# Patient Record
Sex: FEMALE | Race: BLACK OR AFRICAN AMERICAN | NOT HISPANIC OR LATINO
[De-identification: names, ages, dates, MRNs, and addresses within clinical notes are randomized per-mention and may not be internally consistent; named-entity substitution may affect disease eponyms.]

---

## 2017-05-14 ENCOUNTER — FORM ENCOUNTER (OUTPATIENT)
Age: 52
End: 2017-05-14

## 2017-09-17 ENCOUNTER — FORM ENCOUNTER (OUTPATIENT)
Age: 52
End: 2017-09-17

## 2018-07-19 ENCOUNTER — FORM ENCOUNTER (OUTPATIENT)
Age: 53
End: 2018-07-19

## 2019-01-24 ENCOUNTER — FORM ENCOUNTER (OUTPATIENT)
Age: 54
End: 2019-01-24

## 2019-07-25 ENCOUNTER — FORM ENCOUNTER (OUTPATIENT)
Age: 54
End: 2019-07-25

## 2020-03-10 ENCOUNTER — FORM ENCOUNTER (OUTPATIENT)
Age: 55
End: 2020-03-10

## 2020-09-17 ENCOUNTER — FORM ENCOUNTER (OUTPATIENT)
Age: 55
End: 2020-09-17

## 2021-03-04 PROBLEM — Z00.00 ENCOUNTER FOR PREVENTIVE HEALTH EXAMINATION: Status: ACTIVE | Noted: 2021-03-04

## 2021-04-23 RX ORDER — CHROMIUM 200 MCG
TABLET ORAL
Refills: 0 | Status: ACTIVE | COMMUNITY

## 2021-04-23 RX ORDER — ASCORBIC ACID 500 MG
TABLET ORAL
Refills: 0 | Status: ACTIVE | COMMUNITY

## 2021-04-23 RX ORDER — ANASTROZOLE TABLETS 1 MG/1
TABLET ORAL
Refills: 0 | Status: ACTIVE | COMMUNITY

## 2021-04-26 ENCOUNTER — APPOINTMENT (OUTPATIENT)
Dept: BREAST CENTER | Facility: CLINIC | Age: 56
End: 2021-04-26
Payer: COMMERCIAL

## 2021-04-26 ENCOUNTER — NON-APPOINTMENT (OUTPATIENT)
Age: 56
End: 2021-04-26

## 2021-04-26 VITALS
BODY MASS INDEX: 29.09 KG/M2 | SYSTOLIC BLOOD PRESSURE: 148 MMHG | DIASTOLIC BLOOD PRESSURE: 95 MMHG | WEIGHT: 181 LBS | HEART RATE: 93 BPM | HEIGHT: 66 IN

## 2021-04-26 PROCEDURE — 99072 ADDL SUPL MATRL&STAF TM PHE: CPT

## 2021-04-26 PROCEDURE — 99213 OFFICE O/P EST LOW 20 MIN: CPT

## 2021-11-04 ENCOUNTER — APPOINTMENT (OUTPATIENT)
Dept: BREAST CENTER | Facility: CLINIC | Age: 56
End: 2021-11-04
Payer: COMMERCIAL

## 2021-11-04 VITALS
WEIGHT: 178 LBS | DIASTOLIC BLOOD PRESSURE: 92 MMHG | HEIGHT: 66 IN | SYSTOLIC BLOOD PRESSURE: 146 MMHG | BODY MASS INDEX: 28.61 KG/M2 | HEART RATE: 82 BPM

## 2021-11-04 DIAGNOSIS — N63.0 UNSPECIFIED LUMP IN UNSPECIFIED BREAST: ICD-10-CM

## 2021-11-04 PROCEDURE — 99214 OFFICE O/P EST MOD 30 MIN: CPT

## 2021-11-19 ENCOUNTER — NON-APPOINTMENT (OUTPATIENT)
Age: 56
End: 2021-11-19

## 2021-11-19 DIAGNOSIS — R92.8 OTHER ABNORMAL AND INCONCLUSIVE FINDINGS ON DIAGNOSTIC IMAGING OF BREAST: ICD-10-CM

## 2021-11-22 PROBLEM — R92.8 ABNORMAL FINDING ON BREAST IMAGING: Status: ACTIVE | Noted: 2021-11-22

## 2022-06-10 ENCOUNTER — APPOINTMENT (OUTPATIENT)
Dept: BREAST CENTER | Facility: CLINIC | Age: 57
End: 2022-06-10

## 2022-09-26 ENCOUNTER — APPOINTMENT (OUTPATIENT)
Dept: BREAST CENTER | Facility: CLINIC | Age: 57
End: 2022-09-26

## 2022-09-26 VITALS
WEIGHT: 182 LBS | HEART RATE: 82 BPM | HEIGHT: 66 IN | BODY MASS INDEX: 29.25 KG/M2 | DIASTOLIC BLOOD PRESSURE: 88 MMHG | SYSTOLIC BLOOD PRESSURE: 131 MMHG

## 2022-09-26 DIAGNOSIS — Z78.9 OTHER SPECIFIED HEALTH STATUS: ICD-10-CM

## 2022-09-26 DIAGNOSIS — Z85.3 PERSONAL HISTORY OF MALIGNANT NEOPLASM OF BREAST: ICD-10-CM

## 2022-09-26 DIAGNOSIS — Z13.71 ENCOUNTER FOR NONPROCREATIVE SCREENING FOR GENETIC DISEASE CARRIER STATUS: ICD-10-CM

## 2022-09-26 PROCEDURE — 99213 OFFICE O/P EST LOW 20 MIN: CPT

## 2023-04-12 ENCOUNTER — APPOINTMENT (OUTPATIENT)
Dept: BREAST CENTER | Facility: CLINIC | Age: 58
End: 2023-04-12
Payer: COMMERCIAL

## 2023-04-12 VITALS
HEIGHT: 66 IN | BODY MASS INDEX: 27.48 KG/M2 | HEART RATE: 86 BPM | DIASTOLIC BLOOD PRESSURE: 97 MMHG | SYSTOLIC BLOOD PRESSURE: 142 MMHG | WEIGHT: 171 LBS

## 2023-04-12 PROCEDURE — 99213 OFFICE O/P EST LOW 20 MIN: CPT

## 2023-04-12 NOTE — HISTORY OF PRESENT ILLNESS
[FreeTextEntry1] : Patient is a 56yo F who presents today for breast cancer surveillance. She has hx of  LEFT TM & AND following neoadjuvant chemotherapy in 2015 (age 49) for 3.0cm IDC (ER+/MO+/HER2eq/FISH-), negative margins, 3/6LN. S/p RT. 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 S/p Tamoxifen. Currently on Anastrazole (Dr. Vargas). She denies fhx of breast or ovarian cancer. Pt is BRCA/full panel negative with VUS in BRCA 1 (tested in 2016). Denies palpable masses or skin changes bilaterally.\par \par TNM Staging (2015): ypT2, pN1a, pMX\par TNM Staging (2016): pTis, pN0, pMX\par \par 11/4/21: L MG & US- 12:00 6cmfn 0.6cm palpable mass L US bx- BI RADS 4 \par 11/15/21: L US bx 12:00 (wing clip)- Changes of prior procedure \par 9/26/22: L US- L 0.2cm benign bx proven hypoechoic mass 12:00 6FN. BI-RADS 2

## 2023-04-12 NOTE — PAST MEDICAL HISTORY
[Postmenopausal] : The patient is postmenopausal [Menarche Age ____] : age at menarche was [unfilled] [Menopause Age____] : age at menopause was [unfilled] [Total Preg ___] : G[unfilled] [Live Births ___] : P[unfilled]  [Age At Live Birth ___] : Age at live birth: [unfilled] [History of Hormone Replacement Treatment] : has no history of hormone replacement treatment [de-identified] : menopause 8/2015 [FreeTextEntry6] : No [FreeTextEntry7] : No [FreeTextEntry8] : no

## 2024-04-10 ENCOUNTER — APPOINTMENT (OUTPATIENT)
Dept: BREAST CENTER | Facility: CLINIC | Age: 59
End: 2024-04-10
Payer: COMMERCIAL

## 2024-04-10 VITALS — DIASTOLIC BLOOD PRESSURE: 93 MMHG | HEART RATE: 101 BPM | SYSTOLIC BLOOD PRESSURE: 142 MMHG

## 2024-04-10 DIAGNOSIS — Z90.13 ACQUIRED ABSENCE OF BILATERAL BREASTS AND NIPPLES: ICD-10-CM

## 2024-04-10 DIAGNOSIS — Z85.3 PERSONAL HISTORY OF MALIGNANT NEOPLASM OF BREAST: ICD-10-CM

## 2024-04-10 PROCEDURE — 99213 OFFICE O/P EST LOW 20 MIN: CPT

## 2024-04-10 NOTE — PHYSICAL EXAM
[Normocephalic] : normocephalic [EOMI] : extra ocular movement intact [Supple] : supple [No Supraclavicular Adenopathy] : no supraclavicular adenopathy [No Cervical Adenopathy] : no cervical adenopathy [No Edema] : no edema [No Swelling] : no swelling [No Ulceration] : no ulceration [de-identified] : B/l chest wall/axilla/supraclavicular area: No evidence of recurrence

## 2024-04-10 NOTE — HISTORY OF PRESENT ILLNESS
[FreeTextEntry1] : Patient is a 59yo F who presents today for breast cancer surveillance. She has hx of  LEFT TM & AND following neoadjuvant chemotherapy in 2015 (age 49) for 3.0cm IDC (ER+/TX+/HER2eq/FISH-), negative margins, 3/6LN. S/p RT. She has hx of RIGHT TM & SNLB in 2016 (age 50) for 0.1cm DCIS (ER+/TX-), negative margins, 0/1LN. S/p Tamoxifen. Currently on Anastrazole (Dr. Vargas). She denies fhx of breast or ovarian cancer. Pt is BRCA/full panel negative with VUS in BRCA 1 (tested in 2016). Denies palpable masses or skin changes bilaterally.  TNM Staging (2015): ypT2, pN1a, pMX TNM Staging (2016): pTis, pN0, pMX  11/4/21: L MG & US- 12:00 6cmfn 0.6cm palpable mass L US bx- BI RADS 4  11/15/21: L US bx 12:00 (wing clip)- Changes of prior procedure  9/26/22: L US- L 0.2cm benign bx proven hypoechoic mass 12:00 6FN. BI-RADS 2

## 2025-04-09 ENCOUNTER — APPOINTMENT (OUTPATIENT)
Dept: BREAST CENTER | Facility: CLINIC | Age: 60
End: 2025-04-09
Payer: COMMERCIAL

## 2025-04-09 ENCOUNTER — NON-APPOINTMENT (OUTPATIENT)
Age: 60
End: 2025-04-09

## 2025-04-09 VITALS — DIASTOLIC BLOOD PRESSURE: 103 MMHG | HEART RATE: 108 BPM | SYSTOLIC BLOOD PRESSURE: 156 MMHG | HEIGHT: 66 IN

## 2025-04-09 VITALS
WEIGHT: 180 LBS | BODY MASS INDEX: 29.05 KG/M2 | HEART RATE: 112 BPM | DIASTOLIC BLOOD PRESSURE: 106 MMHG | SYSTOLIC BLOOD PRESSURE: 157 MMHG

## 2025-04-09 DIAGNOSIS — Z85.3 PERSONAL HISTORY OF MALIGNANT NEOPLASM OF BREAST: ICD-10-CM

## 2025-04-09 DIAGNOSIS — Z90.13 ACQUIRED ABSENCE OF BILATERAL BREASTS AND NIPPLES: ICD-10-CM

## 2025-04-09 PROCEDURE — 99213 OFFICE O/P EST LOW 20 MIN: CPT

## 2025-04-09 RX ORDER — ESOMEPRAZOLE MAGNESIUM 40 MG/1
CAPSULE, DELAYED RELEASE ORAL
Refills: 0 | Status: ACTIVE | COMMUNITY